# Patient Record
Sex: FEMALE | Race: WHITE | Employment: FULL TIME | URBAN - METROPOLITAN AREA
[De-identification: names, ages, dates, MRNs, and addresses within clinical notes are randomized per-mention and may not be internally consistent; named-entity substitution may affect disease eponyms.]

---

## 2017-10-06 ENCOUNTER — HOSPITAL ENCOUNTER (OUTPATIENT)
Dept: ENDOSCOPY | Age: 47
Discharge: HOME | End: 2017-10-06
Attending: INTERNAL MEDICINE | Admitting: INTERNAL MEDICINE

## 2017-10-06 LAB
HCG QUALITATIVE: NEGATIVE
REASON FOR REJECTION: NORMAL
REJECTED TEST: NORMAL

## 2017-10-06 RX ORDER — SODIUM CHLORIDE 0.9 % (FLUSH) 0.9 %
10 SYRINGE (ML) INJECTION PRN
Status: DISCONTINUED | OUTPATIENT
Start: 2017-10-06 | End: 2017-10-07 | Stop reason: HOSPADM

## 2017-10-06 RX ORDER — SODIUM CHLORIDE 0.9 % (FLUSH) 0.9 %
10 SYRINGE (ML) INJECTION EVERY 12 HOURS SCHEDULED
Status: DISCONTINUED | OUTPATIENT
Start: 2017-10-06 | End: 2017-10-07 | Stop reason: HOSPADM

## 2017-10-06 RX ORDER — SODIUM CHLORIDE 9 MG/ML
INJECTION, SOLUTION INTRAVENOUS CONTINUOUS
Status: DISCONTINUED | OUTPATIENT
Start: 2017-10-06 | End: 2017-10-07 | Stop reason: HOSPADM

## 2017-10-12 RX ORDER — SODIUM CHLORIDE 0.9 % (FLUSH) 0.9 %
10 SYRINGE (ML) INJECTION PRN
Status: CANCELLED | OUTPATIENT
Start: 2017-10-12

## 2017-10-12 RX ORDER — SODIUM CHLORIDE 0.9 % (FLUSH) 0.9 %
10 SYRINGE (ML) INJECTION EVERY 12 HOURS SCHEDULED
Status: CANCELLED | OUTPATIENT
Start: 2017-10-12

## 2017-10-12 RX ORDER — LIDOCAINE HYDROCHLORIDE 10 MG/ML
1 INJECTION, SOLUTION EPIDURAL; INFILTRATION; INTRACAUDAL; PERINEURAL
Status: CANCELLED | OUTPATIENT
Start: 2017-10-12 | End: 2017-10-12

## 2017-10-12 RX ORDER — SODIUM CHLORIDE 9 MG/ML
INJECTION, SOLUTION INTRAVENOUS CONTINUOUS
Status: CANCELLED | OUTPATIENT
Start: 2017-10-12

## 2017-10-12 RX ORDER — ONDANSETRON 2 MG/ML
4 INJECTION INTRAMUSCULAR; INTRAVENOUS PRN
Status: CANCELLED | OUTPATIENT
Start: 2017-10-12

## 2017-10-13 ENCOUNTER — HOSPITAL ENCOUNTER (OUTPATIENT)
Dept: ENDOSCOPY | Age: 47
Discharge: HOME | End: 2017-10-13
Attending: INTERNAL MEDICINE | Admitting: INTERNAL MEDICINE

## 2017-10-13 VITALS
RESPIRATION RATE: 20 BRPM | SYSTOLIC BLOOD PRESSURE: 118 MMHG | OXYGEN SATURATION: 100 % | DIASTOLIC BLOOD PRESSURE: 77 MMHG | TEMPERATURE: 97.5 F | HEART RATE: 77 BPM

## 2017-10-13 LAB
HCG(URINE) PREGNANCY TEST: NEGATIVE
HEMOGLOBIN: 13.1 GM/DL (ref 12–16)
PERFORMED ON: NORMAL
POC HEMATOCRIT: 39 % (ref 36–48)
POC SAMPLE TYPE: NORMAL

## 2017-10-13 RX ORDER — LIDOCAINE HYDROCHLORIDE 10 MG/ML
1 INJECTION, SOLUTION EPIDURAL; INFILTRATION; INTRACAUDAL; PERINEURAL
Status: ACTIVE | OUTPATIENT
Start: 2017-10-13 | End: 2017-10-13

## 2017-10-13 RX ORDER — SODIUM CHLORIDE 0.9 % (FLUSH) 0.9 %
10 SYRINGE (ML) INJECTION EVERY 12 HOURS SCHEDULED
Status: DISCONTINUED | OUTPATIENT
Start: 2017-10-13 | End: 2017-10-14 | Stop reason: HOSPADM

## 2017-10-13 RX ORDER — SODIUM CHLORIDE 0.9 % (FLUSH) 0.9 %
10 SYRINGE (ML) INJECTION PRN
Status: DISCONTINUED | OUTPATIENT
Start: 2017-10-13 | End: 2017-10-14 | Stop reason: HOSPADM

## 2017-10-13 RX ORDER — ONDANSETRON 2 MG/ML
4 INJECTION INTRAMUSCULAR; INTRAVENOUS PRN
Status: DISCONTINUED | OUTPATIENT
Start: 2017-10-13 | End: 2017-10-14 | Stop reason: HOSPADM

## 2017-10-13 RX ORDER — SODIUM CHLORIDE 9 MG/ML
INJECTION, SOLUTION INTRAVENOUS CONTINUOUS
Status: DISCONTINUED | OUTPATIENT
Start: 2017-10-13 | End: 2017-10-14 | Stop reason: HOSPADM

## 2017-10-13 RX ADMIN — ONDANSETRON 4 MG: 2 INJECTION INTRAMUSCULAR; INTRAVENOUS at 14:22

## 2017-10-13 RX ADMIN — SODIUM CHLORIDE: 9 INJECTION, SOLUTION INTRAVENOUS at 14:20

## 2017-10-13 ASSESSMENT — ENCOUNTER SYMPTOMS: SHORTNESS OF BREATH: 0

## 2017-10-13 ASSESSMENT — PAIN - FUNCTIONAL ASSESSMENT: PAIN_FUNCTIONAL_ASSESSMENT: 0-10

## 2017-10-13 NOTE — ANESTHESIA PRE-OP
daily      escitalopram (LEXAPRO) 20 MG tablet Take 20 mg by mouth daily      lisinopril (PRINIVIL;ZESTRIL) 10 MG tablet Take 10 mg by mouth daily      fenofibrate (TRICOR) 145 MG tablet Take 145 mg by mouth daily      meclizine (ANTIVERT) 12.5 MG tablet Take 12.5 mg by mouth daily as needed       Current Facility-Administered Medications   Medication Dose Route Frequency Provider Last Rate Last Dose    0.9 % sodium chloride infusion   Intravenous Continuous Autumn Pizza, DO        lidocaine PF 1 % injection 1 mL  1 mL Intradermal Once PRN Autumn Pizza, DO        ondansetron [facility]) injection 4 mg  4 mg Intravenous PRN Autumn Pizza, DO        sodium chloride flush 0.9 % injection 10 mL  10 mL Intravenous 2 times per day Autumn Pizza, DO        sodium chloride flush 0.9 % injection 10 mL  10 mL Intravenous PRN Autumn Pizza, DO           Vital Signs  (Current) There were no vitals filed for this visit. (for past 48 hrs)  No Data Recorded  (last three values)   BP Readings from Last 3 Encounters:   No data found for BP       CBC  No results found for: WBC, RBC, HGB, HCT, MCV, RDW, PLT    CMP  No results found for: NA, K, CL, CO2, BUN, CREATININE, GFRAA, AGRATIO, LABGLOM, GLUCOSE, PROT, CALCIUM, BILITOT, ALKPHOS, AST, ALT    BMP  No results found for: NA, K, CL, CO2, BUN, CREATININE, CALCIUM, GFRAA, LABGLOM, GLUCOSE    Coags   No results found for: PROTIME, INR, APTT    HCG (If Applicable) No results found for: PREGTESTUR, PREGSERUM, HCG, HCGQUANT     ABGs  No results found for: PHART, PO2ART, TDZ1TLU, BIZ7UJZ, BEART, I3EZFMOP     Type & Screen (If Applicable)  No results found for: LABABO, LABRH      POCGlucose  No results for input(s): GLUCOSE in the last 72 hours. NPO Status  > 8 hours                       BMI  There is no height or weight on file to calculate BMI.   Estimated body mass index is 28.42 kg/m² as calculated from the following:    Height as of 10/12/17: 5' 3.5\" (1.613

## 2017-10-13 NOTE — H&P
[address] [place] [address]    Pre-operative History and Physical    Patient: Nabeel Ramsey  : 1970  Acct#:     History Obtained From:  patient, electronic medical record    HISTORY OF PRESENT ILLNESS:    The patient is a 52 y.o. female with significant past medical history of colon polyps s/p colonoscopy with polypectomy on 10/6/17 who presents with hematochezia. Past Medical History:        Diagnosis Date    Hyperlipidemia     Hypertension     Vertigo      Past Surgical History:        Procedure Laterality Date    APPENDECTOMY      CHOLECYSTECTOMY       Medications Prior to Admission:   Current Outpatient Prescriptions on File Prior to Encounter   Medication Sig Dispense Refill    omeprazole (PRILOSEC OTC) 20 MG tablet Take 20 mg by mouth daily      ferrous gluconate (FERGON) 324 (38 Fe) MG tablet Take 324 mg by mouth 2 times daily      Multiple Vitamins-Minerals (THERAPEUTIC MULTIVITAMIN-MINERALS) tablet Take 1 tablet by mouth daily      aspirin 81 MG tablet Take 81 mg by mouth daily      escitalopram (LEXAPRO) 20 MG tablet Take 20 mg by mouth daily      lisinopril (PRINIVIL;ZESTRIL) 10 MG tablet Take 10 mg by mouth daily      fenofibrate (TRICOR) 145 MG tablet Take 145 mg by mouth daily      meclizine (ANTIVERT) 12.5 MG tablet Take 12.5 mg by mouth daily as needed       No current facility-administered medications on file prior to encounter. Allergies:  Review of patient's allergies indicates no known allergies. History of allergic reaction to anesthesia:  No    Social History:   TOBACCO:   has no tobacco history on file. ETOH:   has no alcohol history on file. DRUGS:   has no drug history on file. Family History:   No family history on file.     PHYSICAL EXAM:      [facility] 10/09/2017  I        Heart:  Normal apical impulse, regular rate and rhythm, normal S1 and S2, no S3 or S4, and no murmur noted    Lungs:  No increased work of breathing, good air exchange, clear to auscultation bilaterally, no crackles or wheezing    Abdomen:  No scars, normal bowel sounds, soft, non-distended, non-tender, no masses palpated, no hepatosplenomegally      ASA Grade:  ASA 2 - Patient with mild systemic disease with no functional limitations    Mallampati Class:  Class I: Soft palate, uvula, fauces, pillars visible  __________  Class II: Soft palate, uvula, fauces visible  ____X_____   Class III: Soft palate, base of uvula visible  __________  Class IV: Hard palate only visible   __________        ASSESSMENT AND PLAN:    1. Patient is a 52 y.o. female here for colonoscopy with anesthesia  2. Procedure options, risks and benefits reviewed with patient. Patient expresses understanding.      Dayan Jarrett MD  [address]  10/13/2017

## 2017-10-13 NOTE — OP NOTE
[address] [place] [address]  Endoscopy Note    Patient: Noble Chand  : 1970  Acct#: [de-identified]    Procedure: Colonoscopy with hemoclip placement x 4    Date:  10/13/2017    Surgeon:  Camila Hutchison MD    Referring Physician:       Preoperative Diagnosis:   Hematochezia    Postoperative Diagnosis:   Post polypectomy hemorrhage    Anesthesia:  TIVA/MAC per anesthesia     EBL: <50 mL    Indications: This is a 52y.o. year old female who presents today with rectal bleeding s/p colonoscopy with polypectomy on 10/6. Procedure: An informed consent was obtained from the patient after explanation of indications, benefits, possible risks and complications of the procedure. The patient was then taken to the endoscopy suite, placed in the left lateral decubitus position, and the above IV anesthesia was administered. A digital rectal examination was performed and revealed a perianal wart, but was without mass or tenderness. The Olympus CFQ-180-AL video colonoscope was placed in the patient's rectum under digital direction and advanced to the cecum. The cecum was identified by characteristic anatomy and ballottment. The prep was good. No blood nor active bleeding seen. The ileocecal valve was identified and intubated     Visualization of the terminal ileum demonstrated normal mucosa. The scope was then withdrawn back through the cecum, ascending, transverse, descending and sigmoid colons. Carefull circumferential examination of the mucosa in these areas demonstrated:    1) Two visible polypectomy sites in ascending colon and transverse: The ascending colon site had a clean base. The transverse colon polypectomy site was 1 cm and had a small adherent clot. Four hemoclips were applied to close the polypectomy site. Good hemostasis was noted at procedure end.  2) Otherwise normal colon. The scope was then withdrawn into the rectum and retroflexed.   The retroflexed view of the anal

## 2017-10-13 NOTE — ANESTHESIA POST-OP
[address] [facility] Anesthesiology  Post-Anesthesia Note       Name:  Pro Lyman                                         Age:  52 y.o. MRN:  [id number]     Last Vitals & Oxygen Saturation: BP (!) 129/95 Comment: left arm  Pulse 87   Temp 97.8 °F (36.6 °C) (Temporal)   Resp 16   LMP 10/09/2017   Patient Vitals for the past 4 hrs:   BP Temp Temp src Pulse Resp   10/13/17 1426 (!) 129/95 97.8 °F (36.6 °C) Temporal 87 16       Level of consciousness:  Awake, alert    Respiratory: Respirations easy, no distress. Stable. Cardiovascular: Hemodynamically stable. Hydration: Adequate. PONV: Adequately managed. Post-op pain: Adequately controlled. Post-op assessment: Tolerated anesthetic well without complication. Complications:  None.     Huyen Miller MD  October 13, 2017   2:59 PM

## 2017-10-17 NOTE — ANESTHESIA POST-OP
Anesthesia Post-op Note    Patient: Noble Chand  MRN: [id number]  YOB: 1970  Date of evaluation: 10/17/2017  Time:  3:06 PM     Procedure(s) Performed:     Last Vitals: LMP 08/13/2017 (Exact Date)     Fernandez Phase I:      Fernandez Phase II:      Anesthesia Post Evaluation    Final anesthesia type: MAC  Patient location during evaluation: PACU  Airway patency: patent  Complications: no  Cardiovascular status: blood pressure returned to baseline  Respiratory status: acceptable        Bridger Marquez MD  3:06 PM

## 2017-11-01 ENCOUNTER — HOSPITAL ENCOUNTER (OUTPATIENT)
Dept: ENDOSCOPY | Age: 47
Discharge: HOME | End: 2017-10-26
Attending: INTERNAL MEDICINE | Admitting: INTERNAL MEDICINE

## 2017-11-01 ENCOUNTER — HOSPITAL ENCOUNTER (OUTPATIENT)
Dept: ENDOSCOPY | Age: 47
Discharge: HOME | End: 2017-11-01
Attending: INTERNAL MEDICINE | Admitting: INTERNAL MEDICINE

## 2017-11-01 VITALS
BODY MASS INDEX: 27.83 KG/M2 | RESPIRATION RATE: 17 BRPM | TEMPERATURE: 97.6 F | DIASTOLIC BLOOD PRESSURE: 80 MMHG | SYSTOLIC BLOOD PRESSURE: 120 MMHG | HEART RATE: 65 BPM | HEIGHT: 64 IN | WEIGHT: 163 LBS | OXYGEN SATURATION: 100 %

## 2017-11-01 LAB — HCG(URINE) PREGNANCY TEST: NEGATIVE

## 2017-11-01 RX ORDER — SODIUM CHLORIDE 0.9 % (FLUSH) 0.9 %
10 SYRINGE (ML) INJECTION PRN
Status: DISCONTINUED | OUTPATIENT
Start: 2017-11-01 | End: 2017-11-02 | Stop reason: HOSPADM

## 2017-11-01 RX ORDER — SODIUM CHLORIDE 0.9 % (FLUSH) 0.9 %
10 SYRINGE (ML) INJECTION EVERY 12 HOURS SCHEDULED
Status: DISCONTINUED | OUTPATIENT
Start: 2017-11-01 | End: 2017-11-02 | Stop reason: HOSPADM

## 2017-11-01 RX ORDER — SODIUM CHLORIDE 9 MG/ML
INJECTION, SOLUTION INTRAVENOUS CONTINUOUS
Status: DISCONTINUED | OUTPATIENT
Start: 2017-11-01 | End: 2017-11-02 | Stop reason: HOSPADM

## 2017-11-01 RX ADMIN — SODIUM CHLORIDE: 9 INJECTION, SOLUTION INTRAVENOUS at 13:34

## 2017-11-01 ASSESSMENT — PAIN - FUNCTIONAL ASSESSMENT: PAIN_FUNCTIONAL_ASSESSMENT: 0-10

## 2017-11-01 ASSESSMENT — ENCOUNTER SYMPTOMS: SHORTNESS OF BREATH: 0

## 2017-11-01 NOTE — ANESTHESIA PRE-OP
times daily      Multiple Vitamins-Minerals (THERAPEUTIC MULTIVITAMIN-MINERALS) tablet Take 1 tablet by mouth daily      aspirin 81 MG tablet Take 81 mg by mouth daily      escitalopram (LEXAPRO) 20 MG tablet Take 20 mg by mouth daily      lisinopril (PRINIVIL;ZESTRIL) 10 MG tablet Take 10 mg by mouth daily      fenofibrate (TRICOR) 145 MG tablet Take 145 mg by mouth daily      meclizine (ANTIVERT) 12.5 MG tablet Take 12.5 mg by mouth daily as needed       Current Facility-Administered Medications   Medication Dose Route Frequency Provider Last Rate Last Dose    0.9 % sodium chloride infusion   Intravenous Continuous Farhana Cruz MD        sodium chloride flush 0.9 % injection 10 mL  10 mL Intravenous 2 times per day Farhana Cruz MD        sodium chloride flush 0.9 % injection 10 mL  10 mL Intravenous PRN Farhana Cruz MD           Vital Signs  (Current)   Vitals:    17 1308   BP: 134/80   Pulse: 56   Resp: 16   Temp: 98 °F (36.7 °C)   SpO2: 99%     (for past 48 hrs)  Temp  Av °F (36.7 °C)  Min: 98 °F (36.7 °C)   Min taken time: 17 1308  Max: 98 °F (36.7 °C)   Max taken time: 17 1308  Pulse  Av  Min: 64   Min taken time: 17 1308  Max: 56   Max taken time: 17 1308  Resp  Av  Min: 16   Min taken time: 17 1308  Max: 16   Max taken time: 17 1308  BP  Min: 134/80   Min taken time: 17 1308  Max: 134/80   Max taken time: 17 1308  SpO2  Av %  Min: 99 %   Min taken time: 17 1308  Max: 99 %   Max taken time: 17 1308  (last three values)   BP Readings from Last 3 Encounters:   17 134/80   10/13/17 118/77       CBC  Lab Results   Component Value Date    HGB 13.1 10/13/2017       CMP  No results found for: NA, K, CL, CO2, BUN, CREATININE, GFRAA, AGRATIO, LABGLOM, GLUCOSE, PROT, CALCIUM, BILITOT, ALKPHOS, AST, ALT    BMP  No results found for: NA, K, CL, CO2, BUN, CREATININE, CALCIUM, GFRAA, LABGLOM, GLUCOSE    Coags   No results found for: PROTIME, INR, APTT    HCG (If Applicable)   Lab Results   Component Value Date    PREGTESTUR Negative 10/13/2017        ABGs  No results found for: PHART, PO2ART, KTS2NZG, TUQ0UPH, BEART, X4PHWPFO     Type & Screen (If Applicable)  No results found for: LABABO, LABRH      POCGlucose  No results for input(s): GLUCOSE in the last 72 hours. NPO Status  > 8 hours                       BMI  Body mass index is 28.42 kg/m². Estimated body mass index is 28.42 kg/m² as calculated from the following:    Height as of this encounter: 5' 3.5\" (1.613 m). Weight as of this encounter: 163 lb (73.9 kg). Additional Testing (Echo, Stress, ECG, PFTs, etc)        Anesthesia Evaluation  Patient summary reviewed and Nursing notes reviewed no history of anesthetic complications:   Airway: Mallampati: II  TM distance: >3 FB   Neck ROM: full  Mouth opening: > = 3 FB Dental:          Pulmonary:       (-) pneumonia, COPD, asthma, shortness of breath, recent URI and sleep apnea                           Cardiovascular:  Exercise tolerance: good (>4 METS),   (+) hypertension:, hyperlipidemia    (-) pacemaker, valvular problems/murmurs, past MI, CAD, CABG/stent, dysrhythmias,  angina,  CHF, orthopnea, PND and  OLIVA    ECG reviewed  Rhythm: regular  Rate: normal                    Neuro/Psych:      (-) seizures, neuromuscular disease, TIA, CVA, headaches and psychiatric history              Comments: vertigo GI/Hepatic/Renal:        (-) hiatal hernia, GERD, PUD, hepatitis, liver disease and bowel prep       Endo/Other:        (-) hypothyroidism, hyperthyroidism, blood dyscrasia, arthritis, no Type II DM, no Type I DM               Abdominal:           Vascular:                                        Anesthesia Plan      MAC     ASA 2       Induction: intravenous. Anesthetic plan and risks discussed with patient. Plan discussed with CRNA.                 DOS STAFF ADDENDUM:    Pt seen and examined, chart reviewed (including anesthesia, drug and allergy history). No interval changes to history and physical examination. Anesthetic plan, risks, benefits, alternatives, and personnel involved discussed with patient. Patient verbalized an understanding and agrees to proceed.       Prosper Eden MD  November 1, 2017  1:27 PM

## 2017-11-01 NOTE — H&P
noted    Lungs:  No increased work of breathing, good air exchange, clear to auscultation bilaterally, no crackles or wheezing    Abdomen:  Soft, nd, NT, +BS      ASA Grade:  ASA 2 - Patient with mild systemic disease with no functional limitations  Mallampati: II    ASSESSMENT AND PLAN:    1. Patient is a 52 y.o. female here for EUS with deep sedation  2. Procedure options, risks and benefits reviewed including but not limited to infection, bleeding, perforation, death, and missed lesions. Specifically discussed with FNA increased risk of bleeding, perforation, infection, and pancreatitis with patient. Patient expresses understanding.

## 2017-11-01 NOTE — PROCEDURES
complications. Endoscopic Findings:  Duodenum- normal major papilla; subtle cobblestoning of the duodenal bulb; 6 biopsies including 2 from the duodenal bulb were obtained. Stomach- normal; retroflexion normal; several biopsies obtained  Esophagus-upper end of the gastric folds is at 35cms and there are two tongues of salmon colored mucosa extending up to 34cms. These were evaluated with white light and NBI. No nodules. Extensive biopsies obtained. Scar tissue and possible tattoo without obvious submucosal mass at 25cms. Findings:  Celiac: Normal  Lymph nodes: no lymphadenopathy  Limited imaging of the left lobe of the liver was normal  The left adrenal gland was normal.  The pancreatic parenchyma was normal.   The pancreatic duct measured 2.1mm in the HOP, 1.3mm in the BOP, and 0.6mm in the TOP. The biliary tree evaluation was normal.  The CBD measured 4.1mm in diameter. No stones or sludge present. There was subtle thickening of the muscularis propria at 25cms measuring 3.2 x 1.5mm. This was doppler negative. This is does not cause a mucosal protuberance. There is a vessel in the area as well which did cause a mucosal protuberance. 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 Van Rabago as previously scheduled.        Jordin Cuenca MD 11/1/2017

## 2017-11-01 NOTE — ANESTHESIA PRE-OP
[address] [facility] Anesthesiology  Pre-Anesthesia Evaluation/Consultation       Name:  Leighton Sal                                         Age:  52 y.o. MRN:    [id number]           Procedure (Scheduled): ESOPHAGOGASTRODUODENOSCOPY WITH ULTRASOUND   Surgeon:     Dr. Anne Melchor MD     No Known Allergies  There is no problem list on file for this patient. Past Medical History:   Diagnosis Date    Hyperlipidemia     Hypertension     Vertigo      Past Surgical History:   Procedure Laterality Date    APPENDECTOMY      CHOLECYSTECTOMY      COLONOSCOPY  10/13/2017     Social History   Substance Use Topics    Smoking status: Not on file    Smokeless tobacco: Not on file    Alcohol use Not on file       Prior to Admission medications    Medication Sig Start Date End Date Taking?  Authorizing Provider   omeprazole (PRILOSEC OTC) 20 MG tablet Take 20 mg by mouth daily    Historical Provider, MD   ferrous gluconate (FERGON) 324 (38 Fe) MG tablet Take 324 mg by mouth 2 times daily    Historical Provider, MD   Multiple Vitamins-Minerals (THERAPEUTIC MULTIVITAMIN-MINERALS) tablet Take 1 tablet by mouth daily    Historical Provider, MD   aspirin 81 MG tablet Take 81 mg by mouth daily    Historical Provider, MD   escitalopram (LEXAPRO) 20 MG tablet Take 20 mg by mouth daily    Historical Provider, MD   lisinopril (PRINIVIL;ZESTRIL) 10 MG tablet Take 10 mg by mouth daily    Historical Provider, MD   fenofibrate (TRICOR) 145 MG tablet Take 145 mg by mouth daily    Historical Provider, MD   meclizine (ANTIVERT) 12.5 MG tablet Take 12.5 mg by mouth daily as needed    Historical Provider, MD       Current Outpatient Prescriptions   Medication Sig Dispense Refill    omeprazole (PRILOSEC OTC) 20 MG tablet Take 20 mg by mouth daily      ferrous gluconate (FERGON) 324 (38 Fe) MG tablet Take 324 mg by mouth 2 times daily      Multiple Vitamins-Minerals (THERAPEUTIC MULTIVITAMIN-MINERALS) tablet Take 1 tablet by mouth daily      aspirin 81 MG tablet Take 81 mg by mouth daily      escitalopram (LEXAPRO) 20 MG tablet Take 20 mg by mouth daily      lisinopril (PRINIVIL;ZESTRIL) 10 MG tablet Take 10 mg by mouth daily      fenofibrate (TRICOR) 145 MG tablet Take 145 mg by mouth daily      meclizine (ANTIVERT) 12.5 MG tablet Take 12.5 mg by mouth daily as needed       No current facility-administered medications for this encounter. Vital Signs  (Current) There were no vitals filed for this visit. (for past 48 hrs)  No Data Recorded  (last three values)   BP Readings from Last 3 Encounters:   10/13/17 118/77       CBC  Lab Results   Component Value Date    HGB 13.1 10/13/2017       CMP  No results found for: NA, K, CL, CO2, BUN, CREATININE, GFRAA, AGRATIO, LABGLOM, GLUCOSE, PROT, CALCIUM, BILITOT, ALKPHOS, AST, ALT    BMP  No results found for: NA, K, CL, CO2, BUN, CREATININE, CALCIUM, GFRAA, LABGLOM, GLUCOSE    Coags   No results found for: PROTIME, INR, APTT    HCG (If Applicable)   Lab Results   Component Value Date    PREGTESTUR Negative 10/13/2017        ABGs  No results found for: PHART, PO2ART, LYW0TQO, CHU4AAN, BEART, O5NXAWSF     Type & Screen (If Applicable)  No results found for: LABABO, LABRH      POCGlucose  No results for input(s): GLUCOSE in the last 72 hours. NPO Status  > 8 hours                       BMI  There is no height or weight on file to calculate BMI. Estimated body mass index is 28.42 kg/m² as calculated from the following:    Height as of 10/12/17: 5' 3.5\" (1.613 m). Weight as of 10/12/17: 163 lb (73.9 kg).       Additional Testing (Echo, Stress, ECG, PFTs, etc)        Anesthesia Evaluation  Patient summary reviewed and Nursing notes reviewed no history of anesthetic complications:   Airway: Mallampati: II  TM distance: >3 FB   Neck ROM: full  Mouth opening: > = 3 FB Dental:          Pulmonary:       (-) pneumonia, COPD, asthma, shortness of breath, recent URI and sleep apnea                           Cardiovascular:  Exercise tolerance: good (>4 METS),   (+) hypertension:,     (-) pacemaker, valvular problems/murmurs, past MI, CAD, CABG/stent, dysrhythmias,  angina,  CHF, orthopnea, PND and  OLIVA      Rhythm: regular  Rate: normal                    Neuro/Psych:      (-) seizures, neuromuscular disease, TIA, CVA, headaches and psychiatric history           GI/Hepatic/Renal:        (-) hiatal hernia, GERD, PUD, hepatitis, liver disease and bowel prep       Endo/Other:        (-) hypothyroidism, hyperthyroidism, blood dyscrasia, arthritis, no Type II DM, no Type I DM               Abdominal:           Vascular:                                      Anesthesia Plan      MAC     ASA 2       Induction: intravenous. Anesthetic plan and risks discussed with patient. Plan discussed with CRNA. DOS STAFF ADDENDUM:    Pt seen and examined, chart reviewed (including anesthesia, drug and allergy history). No interval changes to history and physical examination. Anesthetic plan, risks, benefits, alternatives, and personnel involved discussed with patient. Patient verbalized an understanding and agrees to proceed.       Chavez Guadarrama MD  November 1, 2017  7:26 AM

## 2017-11-12 NOTE — ANESTHESIA POST-OP
Anesthesia Post-op Note    Patient: Marlene Roth  MRN: [id number]  YOB: 1970  Date of evaluation: 11/12/2017  Time:  12:44 PM     Procedure(s) Performed:     Last Vitals: /80   Pulse 65   Temp 97.6 °F (36.4 °C) (Temporal)   Resp 17   Ht 5' 3.5\" (1.613 m)   Wt 163 lb (73.9 kg)   LMP 10/09/2017   SpO2 100%   BMI 28.42 kg/m²     Fernandez Phase I: Fernandez Score: 10    Fernandez Phase II: Fernandez Score: 9    Anesthesia Post Evaluation    Final anesthesia type: TIVA  Patient location during evaluation: PACU  Airway patency: patent  Complications: no  Cardiovascular status: hemodynamically stable  Respiratory status: acceptable        Radha Aguirre MD  12:44 PM

## 2018-11-28 ENCOUNTER — OFFICE VISIT (OUTPATIENT)
Dept: ENT CLINIC | Age: 48
End: 2018-11-28
Payer: COMMERCIAL

## 2018-11-28 VITALS
SYSTOLIC BLOOD PRESSURE: 136 MMHG | HEIGHT: 64 IN | DIASTOLIC BLOOD PRESSURE: 86 MMHG | BODY MASS INDEX: 27.83 KG/M2 | WEIGHT: 163 LBS

## 2018-11-28 DIAGNOSIS — K13.21 LEUKOPLAKIA OF ORAL CAVITY: Primary | ICD-10-CM

## 2018-11-28 PROCEDURE — 99202 OFFICE O/P NEW SF 15 MIN: CPT | Performed by: OTOLARYNGOLOGY

## 2018-11-28 NOTE — PROGRESS NOTES
The patient is a 44-year-old that presents due to an incidental finding seen in the dentist's office on a routine dental cleaning. She had seen it previously thinking it was a scar given that she occasionally ruminates. She is also now smoking approximately a pack of cigarettes per week, But that started only about 2 years ago after the death of her mother. There were no symptoms referable to the upper aerodigestive tract    Her current medications include Prilosec, Fergon, Lexapro, TriCor, Prinivil. Pertinent findings reveal a small leukoplakic strands of mucosa just posterior to the left inferior 2nd molar and lateral to the gingiva buccal sulcus. It does not involve the parotid duct nor the retromolar trigone        The lips and oral cavity are normal with no discrete mucosal disease, and normally hydrated. Tongue mobility is normal. The fungiform and vallate papillae are normal appearing. Dentition is unremarkable including the gingiva The soft palate and uvula are of normal configuration, with no displacement of the palatoglossal or palatopharyngeal folds, and no obstruction to the oropharynx. The posterior pharyngeal wall is normal appearing without exudate or drainage. There is no submandibular or submental or upper jugular adenopathy. Finding does suggest probable scarring.   The mucosa is generally intact and not notably elevated  We will recheck again in 1 month to ensure that there is no clinical behavior to this lesion that should be pursued  We also discussed smoking cessation

## 2019-01-08 ENCOUNTER — OFFICE VISIT (OUTPATIENT)
Dept: ENT CLINIC | Age: 49
End: 2019-01-08
Payer: COMMERCIAL

## 2019-01-08 VITALS — SYSTOLIC BLOOD PRESSURE: 150 MMHG | DIASTOLIC BLOOD PRESSURE: 94 MMHG | OXYGEN SATURATION: 98 % | HEART RATE: 80 BPM

## 2019-01-08 DIAGNOSIS — K13.21 LEUKOPLAKIA OF ORAL CAVITY: Primary | ICD-10-CM

## 2019-01-08 PROCEDURE — 99211 OFF/OP EST MAY X REQ PHY/QHP: CPT | Performed by: OTOLARYNGOLOGY
